# Patient Record
Sex: FEMALE | Race: BLACK OR AFRICAN AMERICAN | Employment: FULL TIME | URBAN - METROPOLITAN AREA
[De-identification: names, ages, dates, MRNs, and addresses within clinical notes are randomized per-mention and may not be internally consistent; named-entity substitution may affect disease eponyms.]

---

## 2019-02-13 NOTE — H&P
PATIENT HISTORY Patient with a long history uterine fibroids, prior removal of a single fibroid through open myomectomy in 2008. Later SBO. Has recurrent multiple large and small fibroids with associated menorrhagia. Uterus extends to 20wk gestational size Past Medical: Fibroids, GERD Past Surgical- Open myomectomy- 2008, open small bowel resection due to obstruction in 2012. Meds: Norethindrone 5mg/d, Zantac PRN Allergies: NKDA Soc: works as an army  Review of Systems:  
Constitutional: Meagan Guzmná denies fever, weight loss or other constitutional symptoms. Eye: Meagan Guzmán denies ocular pain or other visual symptoms. Cardiovascular: Meagan Guzmán denies palpitations, chest pain or other cardiovascular problem. Respiratory: Meagan Guzmán denies cough, breathing difficulty or other respiratory condition. Gastrointestinal: Tiarra denies nausea, vomiting, diarrhea, constipation or other gastrointestinal symptom. Genitourinary: Meagan Guzmán denies dysuria, incontinence, hematuria or other genitourinary symptom. Integumentary: Meagan Guzmán denies galactorrhea or other breast problem. Neurologic: Meagan Guzmán denies sensory defects, problems with coordination or concentration, or other neurologic problem. Psychiatric: Meagan Guzmán denies anxiey, depression or any psychiatric problems. VITAL SIGNS Height : 5 ft 7 in. Weight: 154 BMI: 24.158 BP: 113 / 78 Resp. : 16 . . PHYSICAL EXAM  
General Appearance: Well developed, well nourished patient in no acute distress. Skin: Normal. No rash, lesions or hirsutism. Head: Normocephalic / atraumatic. EENT: Normal eyes, ears, nose and throat. Neck: No masses, lymphadenopathy or thyromegaly. Chest: Normal breath sounds bilaterally with no rales, rhonchi or wheezes. Cardiac: Regular rate and rhythm without murmurs, rubs or gallops. Abdomen: Soft and non-tender to palpation. No organomegaly or massess. Extremities: No cyanosis, clubbing or edema. Neurologic: Grossly intact. Female Pelvic: Normal.  
 
 
DISCUSSION / INFORMED CONSENT The patient and mother was present for this discussion. We discussed the details of the planned surgery including the risks, benefits and treatment alternatives. We discussed the incision sites and the anticipated recovery period as well as the risks, benefits, and alternatives to the procedure. The risks include damage to bowel, bladder, or other organs, risk of bleeding, infection, adhesion formation, risk of the procedure not being able to be fully completed and need for future surgery. There are also small risks of requiring a blood transfusion, risks associated with anesthesia, development of deep venous thrombosis, heart attack, and even death. We discussed the possibility that this surgery may not completely eliminate her problem and that future procedures may be needed. Specific to a laparoscopy, there are additional risks of damage to internal organs due to the placement of intrabdominal trocars and instruments. In addition, there may be a need to convert the laparoscopy to an open procedure using a larger abdominal incision due to complications or inability to complete the procedure laparoscopically. Specific to a hysteroscopy are risks of uterine perforation, uterine bleeding, infection, and the risks of fluid overload due to absorption of hysteroscopic fluids. In some cases laparoscopy will need to be performed, and in rare cases, mainly due bleeding from perforation, a laparotomy, or incision on the abdomen will be necessary. We discusssed the laparoscopic myomectomy procedure including the anticipated abdominal incisions and small chance of needing to convert to an open procedure. In most cases, the majority of fibroids can be removed, but there is always a chance that all fibroids cannot be located or some may not be able to be removed.  Discussed the possibility or need to open the uterine cavity and risk of intrauterine scar tissue formation with this. Discussed the recurrence rate of fibroids and that they may need to be addressed again in the futire. Discussed the risk of bleeding and the possible development of coagulation disorders and/or the need for a blood transfusion during or after the procedure. Also discussed the risk of intraabdominal adhesion formation which could affect the intestines, fallopian tubes and/or subsequent fertility. We discussed the anticipated hospital course and recovery period. I discussed specifically risks associated with fibroid morcellation. Discussed the concerns that a fibroid may harbour a malignancy, but this chance is very low, but does increase with age. In the low chance that a malignancy was found, this could cause it to be in a higher stage possibly affecting treatments and outcomes. An alternative to morcellation would be an open myomectomy or possible removal vaginally, but this is dependent on the clinical situation. However, there is no evidence that an open myomectomy or non-morcellation myomectomy would be associated with better outcomes in the low chance of a malignancy. There are clearly increased risks in morbidity with an open myomectomy vs a laparoscopic myomectomy. Discussed that with her multiple fibroids, there will likely be residual fibroids that I may not be able to find and remove. This could require future surgery to address. With her history, she will have recurrences requiring a hysterectomy in the future most likely. Discussed the variability of bleeding and risks associated with this, and possible need for aborting the procedure early, or very small risk of requiring hysterectomy. Discussed varibility in scar tissue inside given her prior procedures and risk of preexisting tubal damage requiring treatment, including tubal removal.. Discussed risks of needing an open surgery as well. The patient voiced understanding of all of the risks and wished to proceed with the procedure. Informed consents were signed.  
 
PLAN:  
Robotic LSC myomectomy, [address]

## 2019-02-18 ENCOUNTER — HOSPITAL ENCOUNTER (OUTPATIENT)
Dept: SURGERY | Age: 34
Discharge: HOME | End: 2019-02-18

## 2019-02-19 VITALS — HEIGHT: 67 IN | BODY MASS INDEX: 24.96 KG/M2 | WEIGHT: 159 LBS

## 2019-02-19 RX ORDER — NORETHINDRONE 5 MG/1
5 TABLET ORAL
COMMUNITY
End: 2019-05-20

## 2019-02-19 RX ORDER — RANITIDINE 150 MG/1
150 CAPSULE ORAL 2 TIMES DAILY
COMMUNITY

## 2019-02-19 NOTE — PERIOP NOTES
Patient verified name and . Order for consent found in EHR and matches case posting; patient verifies procedure. Type 2 surgery, phone assessment complete. Orders received. Labs per surgeon: CBC DOS- order in EMR on hold for arrival  Labs per anesthesia protocol: no additional labs needed    Patient answered medical/surgical history questions at their best of ability. All prior to admission medications documented in [facility]. Patient instructed to take the following medications the day of surgery according to anesthesia guidelines with a small sip of water: ranitidine. Hold all vitamins 7 days prior to surgery and NSAIDS 5 days prior to surgery. Prescription meds to hold: none. Patient instructed on the following- given verbal and emailed written instructions per pt request:  Arrive at A Entrance, time of arrival to be called the day before by 1700  NPO after midnight including gum, mints, and ice chips  Responsible adult must drive patient to the hospital, stay during surgery, and patient will need supervision 24 hours after anesthesia  Use antibacterial soap in shower the night before surgery and on the morning of surgery  All piercings must be removed prior to arrival.    Leave all valuables (money and jewelry) at home but bring insurance card and ID on       DOS. Do not wear make-up, nail polish, lotions, cologne, perfumes, powders, or oil on skin. Patient teach back successful and patient demonstrates knowledge of instruction.

## 2019-02-23 ENCOUNTER — ANESTHESIA EVENT (OUTPATIENT)
Dept: SURGERY | Age: 34
End: 2019-02-23
Payer: OTHER GOVERNMENT

## 2019-02-25 ENCOUNTER — ANESTHESIA (OUTPATIENT)
Dept: SURGERY | Age: 34
End: 2019-02-25
Payer: OTHER GOVERNMENT

## 2019-02-25 ENCOUNTER — HOSPITAL ENCOUNTER (OUTPATIENT)
Age: 34
Discharge: HOME | End: 2019-02-25
Attending: OBSTETRICS & GYNECOLOGY | Admitting: OBSTETRICS & GYNECOLOGY
Payer: OTHER GOVERNMENT

## 2019-02-25 VITALS
OXYGEN SATURATION: 98 % | TEMPERATURE: 98 F | BODY MASS INDEX: 25 KG/M2 | RESPIRATION RATE: 16 BRPM | WEIGHT: 159.6 LBS | HEART RATE: 88 BPM | SYSTOLIC BLOOD PRESSURE: 131 MMHG | DIASTOLIC BLOOD PRESSURE: 83 MMHG

## 2019-02-25 DIAGNOSIS — R52 PAIN: Primary | ICD-10-CM

## 2019-02-25 PROBLEM — D21.9 FIBROIDS: Status: ACTIVE | Noted: 2019-02-25

## 2019-02-25 LAB
ABO + RH BLD: NORMAL
BLOOD GROUP ANTIBODIES SERPL: NORMAL
ERYTHROCYTE [DISTWIDTH] IN BLOOD BY AUTOMATED COUNT: 15.6 % (ref 11.9–14.6)
HCG UR QL: NEGATIVE
HCT VFR BLD AUTO: 42.1 % (ref 35.8–46.3)
HGB BLD-MCNC: 13.5 G/DL (ref 11.7–15.4)
MCH RBC QN AUTO: 28.2 PG (ref 26.1–32.9)
MCHC RBC AUTO-ENTMCNC: 32.1 G/DL (ref 31.4–35)
MCV RBC AUTO: 88.1 FL (ref 79.6–97.8)
NRBC # BLD: 0 K/UL (ref 0–0.2)
PLATELET # BLD AUTO: 215 K/UL (ref 150–450)
PMV BLD AUTO: 10.9 FL (ref 9.4–12.3)
RBC # BLD AUTO: 4.78 M/UL (ref 4.05–5.2)
SPECIMEN EXP DATE BLD: NORMAL
WBC # BLD AUTO: 5.3 K/UL (ref 4.3–11.1)

## 2019-02-25 PROCEDURE — C1765 ADHESION BARRIER: HCPCS | Performed by: OBSTETRICS & GYNECOLOGY

## 2019-02-25 PROCEDURE — 77030008771 HC TU NG SALEM SUMP -A: Performed by: ANESTHESIOLOGY

## 2019-02-25 PROCEDURE — 77030032490 HC SLV COMPR SCD KNE COVD -B: Performed by: OBSTETRICS & GYNECOLOGY

## 2019-02-25 PROCEDURE — 74011000258 HC RX REV CODE- 258: Performed by: OBSTETRICS & GYNECOLOGY

## 2019-02-25 PROCEDURE — 77030008703 HC TU ET UNCUF COVD -A: Performed by: ANESTHESIOLOGY

## 2019-02-25 PROCEDURE — 77030011502 HC MANIP UTER ZUM ZINN -B: Performed by: OBSTETRICS & GYNECOLOGY

## 2019-02-25 PROCEDURE — C1782 MORCELLATOR: HCPCS | Performed by: OBSTETRICS & GYNECOLOGY

## 2019-02-25 PROCEDURE — 77030031492 HC PRT ACC BLNT AIRSEAL CNMD -B: Performed by: OBSTETRICS & GYNECOLOGY

## 2019-02-25 PROCEDURE — 77030035277 HC OBTRTR BLDELSS DISP INTU -B: Performed by: OBSTETRICS & GYNECOLOGY

## 2019-02-25 PROCEDURE — 77030020782 HC GWN BAIR PAWS FLX 3M -B: Performed by: ANESTHESIOLOGY

## 2019-02-25 PROCEDURE — 77030018836 HC SOL IRR NACL ICUM -A: Performed by: OBSTETRICS & GYNECOLOGY

## 2019-02-25 PROCEDURE — 81025 URINE PREGNANCY TEST: CPT

## 2019-02-25 PROCEDURE — 77030022704 HC SUT VLOC COVD -B: Performed by: OBSTETRICS & GYNECOLOGY

## 2019-02-25 PROCEDURE — 77030008477 HC STYL SATN SLP COVD -A: Performed by: ANESTHESIOLOGY

## 2019-02-25 PROCEDURE — 77030002933 HC SUT MCRYL J&J -A: Performed by: OBSTETRICS & GYNECOLOGY

## 2019-02-25 PROCEDURE — 88307 TISSUE EXAM BY PATHOLOGIST: CPT

## 2019-02-25 PROCEDURE — 74011250636 HC RX REV CODE- 250/636: Performed by: OBSTETRICS & GYNECOLOGY

## 2019-02-25 PROCEDURE — 74011250636 HC RX REV CODE- 250/636

## 2019-02-25 PROCEDURE — 74011250636 HC RX REV CODE- 250/636: Performed by: ANESTHESIOLOGY

## 2019-02-25 PROCEDURE — 76010000882 HC OR TIME 5 TO 5.5HR INTENSV - TIER 2: Performed by: OBSTETRICS & GYNECOLOGY

## 2019-02-25 PROCEDURE — 77030035029 HC NDL INSUF VERES DISP COVD -B: Performed by: OBSTETRICS & GYNECOLOGY

## 2019-02-25 PROCEDURE — 74011000250 HC RX REV CODE- 250

## 2019-02-25 PROCEDURE — 77030031139 HC SUT VCRL2 J&J -A: Performed by: OBSTETRICS & GYNECOLOGY

## 2019-02-25 PROCEDURE — 85027 COMPLETE CBC AUTOMATED: CPT

## 2019-02-25 PROCEDURE — 77030034696 HC CATH URETH FOL 2W BARD -A: Performed by: OBSTETRICS & GYNECOLOGY

## 2019-02-25 PROCEDURE — 74011250637 HC RX REV CODE- 250/637: Performed by: ANESTHESIOLOGY

## 2019-02-25 PROCEDURE — 77030039266 HC ADH SKN EXOFIN S2SG -A: Performed by: OBSTETRICS & GYNECOLOGY

## 2019-02-25 PROCEDURE — 77030035048 HC TRCR ENDOSC OPTCL COVD -B: Performed by: OBSTETRICS & GYNECOLOGY

## 2019-02-25 PROCEDURE — 77030035044 HC TRCR ENDOSC VRSPRT BLDLSS COVD -C: Performed by: OBSTETRICS & GYNECOLOGY

## 2019-02-25 PROCEDURE — 77030020263 HC SOL INJ SOD CL0.9% LFCR 1000ML: Performed by: OBSTETRICS & GYNECOLOGY

## 2019-02-25 PROCEDURE — 77030018846 HC SOL IRR STRL H20 ICUM -A: Performed by: OBSTETRICS & GYNECOLOGY

## 2019-02-25 PROCEDURE — 86900 BLOOD TYPING SEROLOGIC ABO: CPT

## 2019-02-25 PROCEDURE — 74011000250 HC RX REV CODE- 250: Performed by: OBSTETRICS & GYNECOLOGY

## 2019-02-25 PROCEDURE — 76060000042 HC ANESTHESIA 5.5 TO 6 HR: Performed by: OBSTETRICS & GYNECOLOGY

## 2019-02-25 PROCEDURE — 74011250637 HC RX REV CODE- 250/637: Performed by: OBSTETRICS & GYNECOLOGY

## 2019-02-25 PROCEDURE — 77030039425 HC BLD LARYNG TRULITE DISP TELE -A: Performed by: ANESTHESIOLOGY

## 2019-02-25 PROCEDURE — 77030008756 HC TU IRR SUC STRY -B: Performed by: OBSTETRICS & GYNECOLOGY

## 2019-02-25 PROCEDURE — 77030000038 HC TIP SCIS LAPSCP SURI -B: Performed by: OBSTETRICS & GYNECOLOGY

## 2019-02-25 PROCEDURE — 77030010545: Performed by: OBSTETRICS & GYNECOLOGY

## 2019-02-25 PROCEDURE — 77030016151 HC PROTCTR LNS DFOG COVD -B: Performed by: OBSTETRICS & GYNECOLOGY

## 2019-02-25 PROCEDURE — 76210000020 HC REC RM PH II FIRST 0.5 HR: Performed by: OBSTETRICS & GYNECOLOGY

## 2019-02-25 PROCEDURE — 77030019927 HC TBNG IRR CYSTO BAXT -A: Performed by: OBSTETRICS & GYNECOLOGY

## 2019-02-25 PROCEDURE — 76210000017 HC OR PH I REC 1.5 TO 2 HR: Performed by: OBSTETRICS & GYNECOLOGY

## 2019-02-25 RX ORDER — GLYCOPYRROLATE 0.2 MG/ML
INJECTION INTRAMUSCULAR; INTRAVENOUS AS NEEDED
Status: DISCONTINUED | OUTPATIENT
Start: 2019-02-25 | End: 2019-02-25 | Stop reason: HOSPADM

## 2019-02-25 RX ORDER — FENTANYL CITRATE 50 UG/ML
INJECTION, SOLUTION INTRAMUSCULAR; INTRAVENOUS AS NEEDED
Status: DISCONTINUED | OUTPATIENT
Start: 2019-02-25 | End: 2019-02-25 | Stop reason: HOSPADM

## 2019-02-25 RX ORDER — SODIUM CHLORIDE 0.9 % (FLUSH) 0.9 %
5-40 SYRINGE (ML) INJECTION AS NEEDED
Status: DISCONTINUED | OUTPATIENT
Start: 2019-02-25 | End: 2019-02-25 | Stop reason: HOSPADM

## 2019-02-25 RX ORDER — SODIUM CHLORIDE 0.9 % (FLUSH) 0.9 %
5-40 SYRINGE (ML) INJECTION AS NEEDED
Status: CANCELLED | OUTPATIENT
Start: 2019-02-25

## 2019-02-25 RX ORDER — BUPIVACAINE HYDROCHLORIDE AND EPINEPHRINE 2.5; 5 MG/ML; UG/ML
INJECTION, SOLUTION EPIDURAL; INFILTRATION; INTRACAUDAL; PERINEURAL AS NEEDED
Status: DISCONTINUED | OUTPATIENT
Start: 2019-02-25 | End: 2019-02-25 | Stop reason: HOSPADM

## 2019-02-25 RX ORDER — ROCURONIUM BROMIDE 10 MG/ML
INJECTION, SOLUTION INTRAVENOUS AS NEEDED
Status: DISCONTINUED | OUTPATIENT
Start: 2019-02-25 | End: 2019-02-25 | Stop reason: HOSPADM

## 2019-02-25 RX ORDER — OXYCODONE HYDROCHLORIDE 5 MG/1
5 TABLET ORAL
Status: DISCONTINUED | OUTPATIENT
Start: 2019-02-25 | End: 2019-02-25 | Stop reason: HOSPADM

## 2019-02-25 RX ORDER — ONDANSETRON 2 MG/ML
INJECTION INTRAMUSCULAR; INTRAVENOUS AS NEEDED
Status: DISCONTINUED | OUTPATIENT
Start: 2019-02-25 | End: 2019-02-25 | Stop reason: HOSPADM

## 2019-02-25 RX ORDER — LIDOCAINE HYDROCHLORIDE 20 MG/ML
INJECTION, SOLUTION EPIDURAL; INFILTRATION; INTRACAUDAL; PERINEURAL AS NEEDED
Status: DISCONTINUED | OUTPATIENT
Start: 2019-02-25 | End: 2019-02-25 | Stop reason: HOSPADM

## 2019-02-25 RX ORDER — SODIUM CHLORIDE 0.9 % (FLUSH) 0.9 %
5-40 SYRINGE (ML) INJECTION EVERY 8 HOURS
Status: DISCONTINUED | OUTPATIENT
Start: 2019-02-25 | End: 2019-02-25 | Stop reason: HOSPADM

## 2019-02-25 RX ORDER — DEXAMETHASONE SODIUM PHOSPHATE 4 MG/ML
INJECTION, SOLUTION INTRA-ARTICULAR; INTRALESIONAL; INTRAMUSCULAR; INTRAVENOUS; SOFT TISSUE AS NEEDED
Status: DISCONTINUED | OUTPATIENT
Start: 2019-02-25 | End: 2019-02-25 | Stop reason: HOSPADM

## 2019-02-25 RX ORDER — SODIUM CHLORIDE 0.9 % (FLUSH) 0.9 %
5-40 SYRINGE (ML) INJECTION EVERY 8 HOURS
Status: CANCELLED | OUTPATIENT
Start: 2019-02-25

## 2019-02-25 RX ORDER — PROPOFOL 10 MG/ML
INJECTION, EMULSION INTRAVENOUS AS NEEDED
Status: DISCONTINUED | OUTPATIENT
Start: 2019-02-25 | End: 2019-02-25 | Stop reason: HOSPADM

## 2019-02-25 RX ORDER — OXYCODONE AND ACETAMINOPHEN 10; 325 MG/1; MG/1
1 TABLET ORAL AS NEEDED
Status: DISCONTINUED | OUTPATIENT
Start: 2019-02-25 | End: 2019-02-25 | Stop reason: HOSPADM

## 2019-02-25 RX ORDER — VASOPRESSIN 20 U/ML
INJECTION PARENTERAL AS NEEDED
Status: DISCONTINUED | OUTPATIENT
Start: 2019-02-25 | End: 2019-02-25 | Stop reason: HOSPADM

## 2019-02-25 RX ORDER — MISOPROSTOL 200 UG/1
400 TABLET ORAL ONCE
Status: COMPLETED | OUTPATIENT
Start: 2019-02-25 | End: 2019-02-25

## 2019-02-25 RX ORDER — CEFAZOLIN SODIUM/WATER 2 G/20 ML
2 SYRINGE (ML) INTRAVENOUS ONCE
Status: COMPLETED | OUTPATIENT
Start: 2019-02-25 | End: 2019-02-25

## 2019-02-25 RX ORDER — METHYLENE BLUE 10 MG/ML
INJECTION INTRAVENOUS AS NEEDED
Status: DISCONTINUED | OUTPATIENT
Start: 2019-02-25 | End: 2019-02-25 | Stop reason: HOSPADM

## 2019-02-25 RX ORDER — ACETAMINOPHEN 10 MG/ML
INJECTION, SOLUTION INTRAVENOUS AS NEEDED
Status: DISCONTINUED | OUTPATIENT
Start: 2019-02-25 | End: 2019-02-25 | Stop reason: HOSPADM

## 2019-02-25 RX ORDER — SODIUM CHLORIDE 900 MG/100ML
INJECTION INTRAVENOUS AS NEEDED
Status: DISCONTINUED | OUTPATIENT
Start: 2019-02-25 | End: 2019-02-25 | Stop reason: HOSPADM

## 2019-02-25 RX ORDER — MIDAZOLAM HYDROCHLORIDE 1 MG/ML
2 INJECTION, SOLUTION INTRAMUSCULAR; INTRAVENOUS
Status: COMPLETED | OUTPATIENT
Start: 2019-02-25 | End: 2019-02-25

## 2019-02-25 RX ORDER — IBUPROFEN 800 MG/1
800 TABLET ORAL
Qty: 60 TAB | Refills: 0 | Status: SHIPPED | OUTPATIENT
Start: 2019-02-25 | End: 2019-03-07

## 2019-02-25 RX ORDER — NEOSTIGMINE METHYLSULFATE 1 MG/ML
INJECTION INTRAVENOUS AS NEEDED
Status: DISCONTINUED | OUTPATIENT
Start: 2019-02-25 | End: 2019-02-25 | Stop reason: HOSPADM

## 2019-02-25 RX ORDER — SODIUM CHLORIDE, SODIUM LACTATE, POTASSIUM CHLORIDE, CALCIUM CHLORIDE 600; 310; 30; 20 MG/100ML; MG/100ML; MG/100ML; MG/100ML
75 INJECTION, SOLUTION INTRAVENOUS CONTINUOUS
Status: DISCONTINUED | OUTPATIENT
Start: 2019-02-25 | End: 2019-02-25 | Stop reason: HOSPADM

## 2019-02-25 RX ORDER — OXYCODONE AND ACETAMINOPHEN 5; 325 MG/1; MG/1
2 TABLET ORAL
Qty: 30 TAB | Refills: 0 | Status: SHIPPED | OUTPATIENT
Start: 2019-02-25 | End: 2019-05-20

## 2019-02-25 RX ORDER — HYDROMORPHONE HYDROCHLORIDE 2 MG/ML
0.5 INJECTION, SOLUTION INTRAMUSCULAR; INTRAVENOUS; SUBCUTANEOUS
Status: COMPLETED | OUTPATIENT
Start: 2019-02-25 | End: 2019-02-25

## 2019-02-25 RX ORDER — KETAMINE HYDROCHLORIDE 100 MG/ML
INJECTION, SOLUTION INTRAMUSCULAR; INTRAVENOUS AS NEEDED
Status: DISCONTINUED | OUTPATIENT
Start: 2019-02-25 | End: 2019-02-25 | Stop reason: HOSPADM

## 2019-02-25 RX ADMIN — LIDOCAINE HYDROCHLORIDE 100 MG: 20 INJECTION, SOLUTION EPIDURAL; INFILTRATION; INTRACAUDAL; PERINEURAL at 07:51

## 2019-02-25 RX ADMIN — MIDAZOLAM 2 MG: 1 INJECTION INTRAMUSCULAR; INTRAVENOUS at 07:17

## 2019-02-25 RX ADMIN — ROCURONIUM BROMIDE 40 MG: 10 INJECTION, SOLUTION INTRAVENOUS at 07:51

## 2019-02-25 RX ADMIN — ROCURONIUM BROMIDE 10 MG: 10 INJECTION, SOLUTION INTRAVENOUS at 08:22

## 2019-02-25 RX ADMIN — FENTANYL CITRATE 50 MCG: 50 INJECTION, SOLUTION INTRAMUSCULAR; INTRAVENOUS at 12:50

## 2019-02-25 RX ADMIN — FENTANYL CITRATE 50 MCG: 50 INJECTION, SOLUTION INTRAMUSCULAR; INTRAVENOUS at 08:49

## 2019-02-25 RX ADMIN — MISOPROSTOL 400 MCG: 200 TABLET ORAL at 06:16

## 2019-02-25 RX ADMIN — SODIUM CHLORIDE, SODIUM LACTATE, POTASSIUM CHLORIDE, AND CALCIUM CHLORIDE: 600; 310; 30; 20 INJECTION, SOLUTION INTRAVENOUS at 11:03

## 2019-02-25 RX ADMIN — HYDROMORPHONE HYDROCHLORIDE 0.5 MG: 2 INJECTION, SOLUTION INTRAMUSCULAR; INTRAVENOUS; SUBCUTANEOUS at 13:28

## 2019-02-25 RX ADMIN — HYDROMORPHONE HYDROCHLORIDE 0.5 MG: 2 INJECTION, SOLUTION INTRAMUSCULAR; INTRAVENOUS; SUBCUTANEOUS at 13:44

## 2019-02-25 RX ADMIN — HYDROMORPHONE HYDROCHLORIDE 0.5 MG: 2 INJECTION, SOLUTION INTRAMUSCULAR; INTRAVENOUS; SUBCUTANEOUS at 13:20

## 2019-02-25 RX ADMIN — DEXAMETHASONE SODIUM PHOSPHATE 10 MG: 4 INJECTION, SOLUTION INTRA-ARTICULAR; INTRALESIONAL; INTRAMUSCULAR; INTRAVENOUS; SOFT TISSUE at 11:11

## 2019-02-25 RX ADMIN — SODIUM CHLORIDE, SODIUM LACTATE, POTASSIUM CHLORIDE, AND CALCIUM CHLORIDE 75 ML/HR: 600; 310; 30; 20 INJECTION, SOLUTION INTRAVENOUS at 06:16

## 2019-02-25 RX ADMIN — NEOSTIGMINE METHYLSULFATE 4 MG: 1 INJECTION INTRAVENOUS at 12:52

## 2019-02-25 RX ADMIN — FENTANYL CITRATE 50 MCG: 50 INJECTION, SOLUTION INTRAMUSCULAR; INTRAVENOUS at 08:15

## 2019-02-25 RX ADMIN — ROCURONIUM BROMIDE 10 MG: 10 INJECTION, SOLUTION INTRAVENOUS at 10:59

## 2019-02-25 RX ADMIN — HYDROMORPHONE HYDROCHLORIDE 0.5 MG: 2 INJECTION, SOLUTION INTRAMUSCULAR; INTRAVENOUS; SUBCUTANEOUS at 13:36

## 2019-02-25 RX ADMIN — KETAMINE HYDROCHLORIDE 15 MG: 100 INJECTION, SOLUTION INTRAMUSCULAR; INTRAVENOUS at 09:00

## 2019-02-25 RX ADMIN — KETAMINE HYDROCHLORIDE 15 MG: 100 INJECTION, SOLUTION INTRAMUSCULAR; INTRAVENOUS at 10:55

## 2019-02-25 RX ADMIN — FENTANYL CITRATE 50 MCG: 50 INJECTION, SOLUTION INTRAMUSCULAR; INTRAVENOUS at 07:51

## 2019-02-25 RX ADMIN — ROCURONIUM BROMIDE 10 MG: 10 INJECTION, SOLUTION INTRAVENOUS at 11:43

## 2019-02-25 RX ADMIN — PROPOFOL 200 MG: 10 INJECTION, EMULSION INTRAVENOUS at 07:51

## 2019-02-25 RX ADMIN — ACETAMINOPHEN 1000 MG: 10 INJECTION, SOLUTION INTRAVENOUS at 12:52

## 2019-02-25 RX ADMIN — ROCURONIUM BROMIDE 10 MG: 10 INJECTION, SOLUTION INTRAVENOUS at 09:04

## 2019-02-25 RX ADMIN — ONDANSETRON 4 MG: 2 INJECTION INTRAMUSCULAR; INTRAVENOUS at 11:11

## 2019-02-25 RX ADMIN — Medication 2 G: at 07:49

## 2019-02-25 RX ADMIN — ROCURONIUM BROMIDE 20 MG: 10 INJECTION, SOLUTION INTRAVENOUS at 08:30

## 2019-02-25 RX ADMIN — ROCURONIUM BROMIDE 10 MG: 10 INJECTION, SOLUTION INTRAVENOUS at 09:48

## 2019-02-25 RX ADMIN — ROCURONIUM BROMIDE 10 MG: 10 INJECTION, SOLUTION INTRAVENOUS at 10:28

## 2019-02-25 RX ADMIN — SODIUM CHLORIDE, SODIUM LACTATE, POTASSIUM CHLORIDE, AND CALCIUM CHLORIDE: 600; 310; 30; 20 INJECTION, SOLUTION INTRAVENOUS at 08:30

## 2019-02-25 RX ADMIN — KETAMINE HYDROCHLORIDE 35 MG: 100 INJECTION, SOLUTION INTRAMUSCULAR; INTRAVENOUS at 07:51

## 2019-02-25 RX ADMIN — KETAMINE HYDROCHLORIDE 15 MG: 100 INJECTION, SOLUTION INTRAMUSCULAR; INTRAVENOUS at 09:55

## 2019-02-25 RX ADMIN — OXYCODONE HYDROCHLORIDE AND ACETAMINOPHEN 1 TABLET: 10; 325 TABLET ORAL at 14:43

## 2019-02-25 RX ADMIN — GLYCOPYRROLATE 0.6 MG: 0.2 INJECTION INTRAMUSCULAR; INTRAVENOUS at 12:52

## 2019-02-25 NOTE — BRIEF OP NOTE
BRIEF OPERATIVE NOTE Date of Procedure: 2/25/2019 Preoperative Diagnosis: Submucous leiomyoma of uterus [D25.0] Postoperative Diagnosis: Submucous leiomyoma of uterus [D25.0] Procedure(s): 
ROBOTIC ASSISTED MYOMECTOMY, requiring 4 hours total time HYSTEROSCOPY Surgeon(s) and Role: Jolie Cheadle, Angie Whitaker MD - Primary Surgical Assistant: none Surgical Staff: 
Circ-1: Cristofer Banda RN 
Circ-2: Varinder Patel RN 
Circ-Relief: Nick Oliva RN Scrub Tech-1: Lewis Monsivais Scrub Tech-2: Trinidad Cosby Event Time In Time Out Incision Start 1808 Incision Close 1258 Anesthesia: General  
Estimated Blood Loss: 500cc Specimens:  
ID Type Source Tests Collected by Time Destination 1 : UTERINE [id number] Fresh Uterine  Christal Bunch MD 2/25/2019 1240 Pathology Findings: uterus enlarged to 21wks size. Intraabdominal adhesions with omentum adhered densely to anterior abdominal wall, full uterus and anterior pelvis. Adhesions of left tube to anterior uterus as well. Uterus enlarged with multiple fibroids. Total of 26 fibroids removed with the largest being 8cm, and at least 15fibroids >3cm. Multiple small fibroids still remain. Submucosal fibroids intended to be removed by [facility] could not be done due to lack of proper working  surgical instruments Complications: none Implants: * No implants in log *

## 2019-02-25 NOTE — PERIOP NOTES
Pt's family in waiting room updated that surgery is going well and will probably take about 30-45 minutes more.

## 2019-02-25 NOTE — DISCHARGE INSTRUCTIONS
Patient Education        Abdominal Myomectomy: What to Expect at [address] can expect to feel better and stronger each day, although you may tire quickly and need pain medicine for a week or two. You may need about 4 to 6 weeks to fully recover. Do not lift anything heavy while you are recovering so that your incision and your belly muscles can heal.  This care sheet gives you a general idea about how long it will take for you to recover. But each person recovers at a different pace. Follow the steps below to get better as quickly as possible. How can you care for yourself at home? Activity    · Rest when you feel tired. Getting enough sleep will help you recover.     · Try to walk each day. Start out by walking a little more than you did the day before. Bit by bit, increase the amount you walk. Walking boosts blood flow and helps prevent pneumonia and constipation.     · For 4 to 6 weeks, avoid lifting anything that would make you strain. This may include a child, heavy grocery bags and milk containers, a heavy briefcase or backpack, cat litter or dog food bags, or a vacuum .     · Avoid strenuous activities, such as biking, jogging, weightlifting, and aerobic exercise, for 4 to 6 weeks.     · You may shower. Pat the incision dry when you are done. Do not take a bath for the first week after surgery or until your doctor tells you it is okay.     · You may have some light vaginal bleeding. Wear sanitary pads if needed. Do not douche or use tampons.     · Ask your doctor when you can drive again.     · You will probably need to take 2 to 4 weeks off work. It depends on the type of work you do and how you feel.     · Do not have sex until your doctor tells you it is okay.     · Talk about birth control with your doctor. Do not try to become pregnant until your doctor says it is okay. Diet    · You can eat your normal diet.  If your stomach is upset, try bland, low-fat foods like plain rice, broiled chicken, toast, and yogurt.     · Drink plenty of fluids (unless your doctor tells you not to).     · You may notice that your bowel movements are not regular right after your surgery. This is common. Try to avoid constipation and straining with bowel movements. You may want to take a fiber supplement every day. If you have not had a bowel movement after a couple of days, ask your doctor about taking a mild laxative. Medicines    · Your doctor will tell you if and when you can restart your medicines. He or she will also give you instructions about taking any new medicines.     · If you take blood thinners, such as warfarin (Coumadin), clopidogrel (Plavix), or aspirin, be sure to talk to your doctor. He or she will tell you if and when to start taking those medicines again. Make sure that you understand exactly what your doctor wants you to do.     · Take pain medicines exactly as directed. ? If the doctor gave you a prescription medicine for pain, take it as prescribed. ? If you are not taking a prescription pain medicine, take an over-the-counter medicine such as acetaminophen (Tylenol), ibuprofen (Advil, Motrin), or naproxen (Aleve). Read and follow all instructions on the label. Do not take two or more pain medicines at the same time unless the doctor told you to. Many pain medicines contain acetaminophen, which is Tylenol. Too much acetaminophen (Tylenol) can be harmful.     · If you think your pain medicine is making you sick to your stomach:  ? Take your medicine after meals (unless your doctor tells you not to). ? Ask your doctor for a different pain medicine.     · If your doctor prescribed antibiotics, take them as directed. Do not stop taking them just because you feel better. You need to take the full course of antibiotics.    Incision care    · If you have strips of tape on the cut (incision) the doctor made, leave the tape on for a week or until it falls off.     · Wash the area daily with warm, soapy water and pat it dry.     · Keep the area clean and dry. You may cover it with a gauze bandage if it weeps or rubs against clothing. Change the bandage every day. Other instructions    · Wear loose, comfortable clothing and avoid anything that puts pressure on your belly for a few weeks. Follow-up care is a key part of your treatment and safety. Be sure to make and go to all appointments, and call your doctor if you are having problems. It's also a good idea to know your test results and keep a list of the medicines you take. When should you call for help? Call 911 anytime you think you may need emergency care. For example, call if:    · You passed out (lost consciousness).     · You have chest pain, are short of breath, or cough up blood.    Call your doctor now or seek immediate medical care if:    · You have pain that does not get better after you take pain medicine.     · You cannot pass stools or gas.     · You have vaginal discharge that has increased in amount or smells bad.     · You are sick to your stomach or cannot drink fluids.     · You have loose stitches, or your incision comes open.     · Bright red blood has soaked through the bandage over your incision.     · You have signs of infection, such as:  ? Increased pain, swelling, warmth, or redness. ? Red streaks leading from the incision. ? Pus draining from the incision. ? A fever.     · You have bright red vaginal bleeding that soaks one or more pads in an hour, or you have large clots.     · You have signs of a blood clot in your leg (called a deep vein thrombosis), such as:  ? Pain in your calf, back of the knee, thigh, or groin. ? Redness and swelling in your leg or groin.    Watch closely for any changes in your health, and be sure to contact your doctor if you have any problems. Where can you learn more? Go to http://cris-trevor.info/.   Enter L616 in the search box to learn more about \"Abdominal Myomectomy: What to Expect at Home. \"  Current as of: May 14, 2018  Content Version: 11.9  © 3192-7118 [employer]. Care instructions adapted under license by [employer] (which disclaims liability or warranty for this information). If you have questions about a medical condition or this instruction, always ask your healthcare professional. [address] any warranty or liability for your use of this information.

## 2019-02-25 NOTE — ANESTHESIA POSTPROCEDURE EVALUATION
Procedure(s): 
ROBOTIC ASSISTED MYOMECTOMY HYSTEROSCOPY. Anesthesia Post Evaluation Multimodal analgesia: multimodal analgesia used between 6 hours prior to anesthesia start to PACU discharge Patient location during evaluation: bedside Patient participation: complete - patient participated Level of consciousness: awake and alert Pain score: 1 Pain management: adequate Airway patency: patent Anesthetic complications: no 
Cardiovascular status: acceptable Respiratory status: acceptable Hydration status: acceptable Comments: Patient doing well. Continue care on floor. Post anesthesia nausea and vomiting:  none Visit Vitals /71 (BP 1 Location: Left arm, BP Patient Position: At rest) Pulse 84 Temp 36.7 °C (98 °F) Resp 14 Wt 72.4 kg (159 lb 9.6 oz) SpO2 100% BMI 25.00 kg/m²

## 2019-02-25 NOTE — ANESTHESIA PREPROCEDURE EVALUATION
Anesthetic History No history of anesthetic complications Review of Systems / Medical History Patient summary reviewed and pertinent labs reviewed Pulmonary Within defined limits Neuro/Psych Within defined limits Cardiovascular Exercise tolerance: >4 METS 
  
GI/Hepatic/Renal 
  
GERD: well controlled Endo/Other Within defined limits Other Findings Physical Exam 
 
Airway Mallampati: II 
TM Distance: > 6 cm Neck ROM: normal range of motion Mouth opening: Normal 
 
 Cardiovascular Rhythm: regular Dental 
 
 
  
Pulmonary Abdominal 
GI exam deferred Other Findings Anesthetic Plan ASA: 2 Anesthesia type: general 
 
 
 
 
Induction: Intravenous Anesthetic plan and risks discussed with: Patient Ketamine and Ofirmev analgesic supplement

## 2019-02-26 NOTE — OP NOTES
[place]  OPERATIVE REPORT    Name:  Ravin Connelly  MR#:  [id number]  :  1985  ACCOUNT #:  [de-identified]  DATE OF SERVICE:  2019      POSTOPERATIVE DIAGNOSIS: POSTOPERATIVE DIAGNOSES:  1.  Multiple large uterine fibroids. 2.  Menorrhagia. 3.  Intraabdominal adhesions. PROCEDURE PERFORMED:  1.  Robotic-assisted laparoscopic myomectomy of 26 uterine fibroids requiring a total surgical time of 4 hours. 2. Lysis of intraabdominal adhesions requiring 30 minutes of additional time. 3.  Diagnostic hysteroscopy. SURGEON:  JAELYN Short:  None. ANESTHESIA:  General endotracheal.    COMPLICATIONS:  None. DRAINS:  None. SPECIMENS REMOVED:  Uterine fibroids x26 with an approximate weight of 650 g. IMPLANTS:  None. ESTIMATED BLOOD LOSS:  500 mL. FINDINGS:  Uterus was enlarged to 21 weeks gestational size, there were excessive intraabdominal adhesions from the patient's prior open laparotomy x2 via vertical midline incisions. The majority of these adhesions were the omentum, which was densely adhered to anterior abdominal wall, to the full uterus and the anterior pelvis. There were adhesions of the left tube to the anterior uterus as well. There were no direct bowel adhesions to the uterus or abdominal wall. The uterus was enlarged with multiple fibroids, the largest being approximately 8 cm and at least 15 fibroids greater than 3 cm, multiple small fibroids still remain. At hysteroscopy there were multiple submucosal fibroids and I attempted to remove these but because of lack of proper working surgical instruments in the operating room, this could not be done. The right tube and ovary appeared normal.  The left ovary was involved in adhesions as well, but the fimbriated ends of the tubes were preserved. PROCEDURE:  The patient was taken to the operating room, where general endotracheal anesthesia was administered.   She was prepped and draped in normal sterile fashion. Escamilla catheter was inserted. Diagnostic hysteroscopy was performed and there were several submucosal fibroids and initial attempts to remove them revealed that there were no functional hysteroscopic scissors at this facility thus precluding the ability to complete a planned critical portion of the procedure. A HUMI uterine manipulator was then placed. Attention was then turned to the abdomen. The uterus extended to approximately 21 weeks gestational size. There was a vertical midline incision extending from the pubis all the way to the upper umbilicus. A left upper quadrant entry was performed at [facility] insufflating with a Veress needle after deflating the stomach with an OG tube. Veress needle was placed into abdomen without difficulty and the abdomen was insufflated to 5 mmHg after a bladeless trocar was placed under direct visualization into the abdominal cavity, which revealed extensive adhesions of the omentum to the anterior abdominal wall extending from the falciform ligament all the way to the bladder. Laparoscopic scissors were used to take down the adhesions enough to allow placement of the remainder of the trocars. A 12 mm trocar was placed in the midline approximately midway between the umbilicus and the xiphoid. Three 8 mm robotic ports were then placed and a 12 mm assistant port. Patient was placed in Trendelenburg position and the robotic system was then undocked. The robot was used to perform further extensive lysis of adhesions. The lysis of adhesions required an additional 30 minutes of total time. Once the omentum was fully relieved of its adhesions to the anterior abdominal wall and uterus the myomectomy was then performed. A dilute vasopressin solution was injected into the upper posterior myometrium and transverse hysterotomy incisions were made across 3 fibroids at the upper fundus.   These fibroids were enucleated individually and then the uterine defect was closed in multiple layers using 2-0 V-Loc sutures. Further posterior incisions were created removing additional posterior fibroids and all of these were in the 5 cm to 6 cm range posteriorly. There was one additional one on the posterior aspect of the cervix that was approximately 3 cm that was removed in similar fashion. Attention was then turned anteriorly. The patient had had a prior myomectomy and it was believed that this had been removed from the upper anterior fundus based on MRI pictures and dissection in this area was more difficult supporting the fact. Multiple 4  to 5 cm fibroids were removed from the anterior fundus of uterus. Vertical midline incision was made through the uterus to address largest anterior fibroid, which was approximately 7 cm. This was removed and then a second fibroid under this was removed as well that had significant necrosis. Each of these were closed individually and due to the extensive number of incisions and the need for individual closure this added significantly to the surgical time. Several other right anterior 4 cm fibroids were removed as well. Due to the large size of the uterus and its limited mobility in the pelvis it was not possible to reach all the way down to the submucosal fibroids that had previously been seen. Uterus was very bulky and enlarged and did not contract down very well likely due to the prior open myomectomy. At the end of the procedure a total of 26 fibroids, which were removed with an estimated 15 of them being greater than 3 cm. Blood loss was moderate at 500 mL and the patient had been administered misoprostol and tranexamic acid prior to the start of the case. It was felt at this point that as many fibroids as could feasibly be obtained had been removed. the robotic system was undocked.   Careful count of the intraabdominal fibroids had been made and a Susan morcellator was used to fully morcellate all of the uterine fibroids taking great care to ensure that all portions of the fibroids were removed from the abdomen and pelvis. The abdomen and pelvis were copiously irrigated again. The uterus was fully hemostatic at this point. Three sheets of Interceed were placed covering the fundus and the uterus in an effort to reduce adhesion formation. The umbilical fascial incision was then closed with a 0 Vicryl suture. The abdomen was deflated. All skin incisions were closed with 4-0 Monocryl and with surgical glue and Steri-Strips. All sponge, lap, and needle counts were correct. There were no other complications to the procedure.         Risa Askew MD      [id number]  D:  02/25/2019 13:53  T:  02/25/2019 23:41  JOB #:  [id number]  CC:

## 2019-05-14 ENCOUNTER — HOSPITAL ENCOUNTER (OUTPATIENT)
Dept: SURGERY | Age: 34
Discharge: HOME | End: 2019-05-14

## 2019-05-14 VITALS — BODY MASS INDEX: 24.48 KG/M2 | WEIGHT: 156 LBS | HEIGHT: 67 IN

## 2019-05-14 NOTE — PERIOP NOTES
Patient verified name and . Order for consent not found in EHR. Type 1B surgery, PAT phone assessment complete. Orders not received. Labs per surgeon: no orders  Labs per anesthesia protocol: Hgb DOS. Patient answered medical/surgical history questions at their best of ability. All prior to admission medications documented in [facility]. Patient instructed to take the following medications the day of surgery according to anesthesia guidelines with a small sip of water: zantac. Hold all vitamins 7 days prior to surgery and NSAIDS 5 days prior to surgery. Prescription meds to hold:none. Patient instructed on the following:  Arrive at A Entrance, time of arrival to be called the day before by 1700  NPO after midnight including gum, mints, and ice chips  Responsible adult must drive patient to the hospital, stay during surgery, and patient will need supervision 24 hours after anesthesia  Use antibacterial soap in shower the night before surgery and on the morning of surgery  All piercings must be removed prior to arrival.    Leave all valuables (money and jewelry) at home but bring insurance card and ID on   DOS. Do not wear make-up, nail polish, lotions, cologne, perfumes, powders, or oil on skin. Patient teach back successful and patient demonstrates knowledge of instruction.

## 2019-05-19 ENCOUNTER — ANESTHESIA EVENT (OUTPATIENT)
Dept: SURGERY | Age: 34
End: 2019-05-19
Payer: OTHER GOVERNMENT

## 2019-05-20 ENCOUNTER — ANESTHESIA (OUTPATIENT)
Dept: SURGERY | Age: 34
End: 2019-05-20
Payer: OTHER GOVERNMENT

## 2019-05-20 ENCOUNTER — HOSPITAL ENCOUNTER (OUTPATIENT)
Age: 34
Discharge: HOME | End: 2019-05-20
Attending: OBSTETRICS & GYNECOLOGY | Admitting: OBSTETRICS & GYNECOLOGY
Payer: OTHER GOVERNMENT

## 2019-05-20 VITALS
WEIGHT: 156 LBS | BODY MASS INDEX: 24.48 KG/M2 | HEIGHT: 67 IN | HEART RATE: 67 BPM | DIASTOLIC BLOOD PRESSURE: 80 MMHG | RESPIRATION RATE: 15 BRPM | OXYGEN SATURATION: 97 % | SYSTOLIC BLOOD PRESSURE: 128 MMHG | TEMPERATURE: 98.3 F

## 2019-05-20 DIAGNOSIS — R10.2 PELVIC PAIN: Primary | ICD-10-CM

## 2019-05-20 LAB
ERYTHROCYTE [DISTWIDTH] IN BLOOD BY AUTOMATED COUNT: 15.1 % (ref 11.9–14.6)
HCG UR QL: NEGATIVE
HCT VFR BLD AUTO: 38.5 % (ref 35.8–46.3)
HGB BLD-MCNC: 12.1 G/DL (ref 11.7–15.4)
MCH RBC QN AUTO: 28.1 PG (ref 26.1–32.9)
MCHC RBC AUTO-ENTMCNC: 31.4 G/DL (ref 31.4–35)
MCV RBC AUTO: 89.3 FL (ref 79.6–97.8)
NRBC # BLD: 0 K/UL (ref 0–0.2)
PLATELET # BLD AUTO: 293 K/UL (ref 150–450)
PMV BLD AUTO: 10.3 FL (ref 9.4–12.3)
RBC # BLD AUTO: 4.31 M/UL (ref 4.05–5.2)
WBC # BLD AUTO: 5.5 K/UL (ref 4.3–11.1)

## 2019-05-20 PROCEDURE — 74011000250 HC RX REV CODE- 250

## 2019-05-20 PROCEDURE — 74011250636 HC RX REV CODE- 250/636

## 2019-05-20 PROCEDURE — 77030033136 HC TBNG INFLO AQUILEX ST HOLO -C: Performed by: OBSTETRICS & GYNECOLOGY

## 2019-05-20 PROCEDURE — 76010000161 HC OR TIME 1 TO 1.5 HR INTENSV-TIER 1: Performed by: OBSTETRICS & GYNECOLOGY

## 2019-05-20 PROCEDURE — 77030003666 HC NDL SPINAL BD -A: Performed by: OBSTETRICS & GYNECOLOGY

## 2019-05-20 PROCEDURE — 77030018836 HC SOL IRR NACL ICUM -A: Performed by: OBSTETRICS & GYNECOLOGY

## 2019-05-20 PROCEDURE — 77030019927 HC TBNG IRR CYSTO BAXT -A: Performed by: OBSTETRICS & GYNECOLOGY

## 2019-05-20 PROCEDURE — 88305 TISSUE EXAM BY PATHOLOGIST: CPT

## 2019-05-20 PROCEDURE — 85027 COMPLETE CBC AUTOMATED: CPT

## 2019-05-20 PROCEDURE — 77030010509 HC AIRWY LMA MSK TELE -A: Performed by: ANESTHESIOLOGY

## 2019-05-20 PROCEDURE — 74011000250 HC RX REV CODE- 250: Performed by: OBSTETRICS & GYNECOLOGY

## 2019-05-20 PROCEDURE — 76060000033 HC ANESTHESIA 1 TO 1.5 HR: Performed by: OBSTETRICS & GYNECOLOGY

## 2019-05-20 PROCEDURE — 77030033137 HC TBNG OUTFLO AQUILEX ST HOLO -B: Performed by: OBSTETRICS & GYNECOLOGY

## 2019-05-20 PROCEDURE — 77030011272 HC ELECTRD CUT LP OCOA -B: Performed by: OBSTETRICS & GYNECOLOGY

## 2019-05-20 PROCEDURE — 76210000006 HC OR PH I REC 0.5 TO 1 HR: Performed by: OBSTETRICS & GYNECOLOGY

## 2019-05-20 PROCEDURE — 74011250636 HC RX REV CODE- 250/636: Performed by: ANESTHESIOLOGY

## 2019-05-20 PROCEDURE — 77030018830 HC SOL IRR GLYC ICUM-A: Performed by: OBSTETRICS & GYNECOLOGY

## 2019-05-20 PROCEDURE — 77030032490 HC SLV COMPR SCD KNE COVD -B: Performed by: OBSTETRICS & GYNECOLOGY

## 2019-05-20 PROCEDURE — 81025 URINE PREGNANCY TEST: CPT

## 2019-05-20 PROCEDURE — 76210000020 HC REC RM PH II FIRST 0.5 HR: Performed by: OBSTETRICS & GYNECOLOGY

## 2019-05-20 RX ORDER — LIDOCAINE HYDROCHLORIDE 10 MG/ML
0.1 INJECTION INFILTRATION; PERINEURAL AS NEEDED
Status: DISCONTINUED | OUTPATIENT
Start: 2019-05-20 | End: 2019-05-20 | Stop reason: HOSPADM

## 2019-05-20 RX ORDER — SODIUM CHLORIDE, SODIUM LACTATE, POTASSIUM CHLORIDE, CALCIUM CHLORIDE 600; 310; 30; 20 MG/100ML; MG/100ML; MG/100ML; MG/100ML
1000 INJECTION, SOLUTION INTRAVENOUS CONTINUOUS
Status: DISCONTINUED | OUTPATIENT
Start: 2019-05-20 | End: 2019-05-20 | Stop reason: HOSPADM

## 2019-05-20 RX ORDER — MIDAZOLAM HYDROCHLORIDE 1 MG/ML
2 INJECTION, SOLUTION INTRAMUSCULAR; INTRAVENOUS
Status: COMPLETED | OUTPATIENT
Start: 2019-05-20 | End: 2019-05-20

## 2019-05-20 RX ORDER — LIDOCAINE HYDROCHLORIDE 20 MG/ML
INJECTION, SOLUTION EPIDURAL; INFILTRATION; INTRACAUDAL; PERINEURAL AS NEEDED
Status: DISCONTINUED | OUTPATIENT
Start: 2019-05-20 | End: 2019-05-20 | Stop reason: HOSPADM

## 2019-05-20 RX ORDER — SODIUM CHLORIDE, SODIUM LACTATE, POTASSIUM CHLORIDE, CALCIUM CHLORIDE 600; 310; 30; 20 MG/100ML; MG/100ML; MG/100ML; MG/100ML
75 INJECTION, SOLUTION INTRAVENOUS CONTINUOUS
Status: DISCONTINUED | OUTPATIENT
Start: 2019-05-20 | End: 2019-05-20 | Stop reason: HOSPADM

## 2019-05-20 RX ORDER — DIPHENHYDRAMINE HYDROCHLORIDE 50 MG/ML
12.5 INJECTION, SOLUTION INTRAMUSCULAR; INTRAVENOUS ONCE
Status: DISCONTINUED | OUTPATIENT
Start: 2019-05-20 | End: 2019-05-20 | Stop reason: HOSPADM

## 2019-05-20 RX ORDER — HYDROMORPHONE HYDROCHLORIDE 2 MG/ML
0.5 INJECTION, SOLUTION INTRAMUSCULAR; INTRAVENOUS; SUBCUTANEOUS
Status: DISCONTINUED | OUTPATIENT
Start: 2019-05-20 | End: 2019-05-20 | Stop reason: HOSPADM

## 2019-05-20 RX ORDER — NALOXONE HYDROCHLORIDE 0.4 MG/ML
0.1 INJECTION, SOLUTION INTRAMUSCULAR; INTRAVENOUS; SUBCUTANEOUS AS NEEDED
Status: DISCONTINUED | OUTPATIENT
Start: 2019-05-20 | End: 2019-05-20 | Stop reason: HOSPADM

## 2019-05-20 RX ORDER — SODIUM CHLORIDE 0.9 % (FLUSH) 0.9 %
5-40 SYRINGE (ML) INJECTION AS NEEDED
Status: DISCONTINUED | OUTPATIENT
Start: 2019-05-20 | End: 2019-05-20 | Stop reason: HOSPADM

## 2019-05-20 RX ORDER — FENTANYL CITRATE 50 UG/ML
INJECTION, SOLUTION INTRAMUSCULAR; INTRAVENOUS AS NEEDED
Status: DISCONTINUED | OUTPATIENT
Start: 2019-05-20 | End: 2019-05-20 | Stop reason: HOSPADM

## 2019-05-20 RX ORDER — ONDANSETRON 2 MG/ML
4 INJECTION INTRAMUSCULAR; INTRAVENOUS ONCE
Status: DISCONTINUED | OUTPATIENT
Start: 2019-05-20 | End: 2019-05-20 | Stop reason: HOSPADM

## 2019-05-20 RX ORDER — SODIUM CHLORIDE 0.9 % (FLUSH) 0.9 %
5-40 SYRINGE (ML) INJECTION EVERY 8 HOURS
Status: DISCONTINUED | OUTPATIENT
Start: 2019-05-20 | End: 2019-05-20 | Stop reason: HOSPADM

## 2019-05-20 RX ORDER — DEXAMETHASONE SODIUM PHOSPHATE 4 MG/ML
INJECTION, SOLUTION INTRA-ARTICULAR; INTRALESIONAL; INTRAMUSCULAR; INTRAVENOUS; SOFT TISSUE AS NEEDED
Status: DISCONTINUED | OUTPATIENT
Start: 2019-05-20 | End: 2019-05-20 | Stop reason: HOSPADM

## 2019-05-20 RX ORDER — ACETAMINOPHEN 500 MG
500 TABLET ORAL ONCE
Status: DISCONTINUED | OUTPATIENT
Start: 2019-05-20 | End: 2019-05-20 | Stop reason: HOSPADM

## 2019-05-20 RX ORDER — OXYCODONE HYDROCHLORIDE 5 MG/1
10 TABLET ORAL
Status: DISCONTINUED | OUTPATIENT
Start: 2019-05-20 | End: 2019-05-20 | Stop reason: HOSPADM

## 2019-05-20 RX ORDER — EPHEDRINE SULFATE 50 MG/ML
INJECTION, SOLUTION INTRAVENOUS AS NEEDED
Status: DISCONTINUED | OUTPATIENT
Start: 2019-05-20 | End: 2019-05-20 | Stop reason: HOSPADM

## 2019-05-20 RX ORDER — FENTANYL CITRATE 50 UG/ML
100 INJECTION, SOLUTION INTRAMUSCULAR; INTRAVENOUS AS NEEDED
Status: DISCONTINUED | OUTPATIENT
Start: 2019-05-20 | End: 2019-05-20 | Stop reason: HOSPADM

## 2019-05-20 RX ORDER — PROPOFOL 10 MG/ML
INJECTION, EMULSION INTRAVENOUS AS NEEDED
Status: DISCONTINUED | OUTPATIENT
Start: 2019-05-20 | End: 2019-05-20 | Stop reason: HOSPADM

## 2019-05-20 RX ORDER — HYDROCODONE BITARTRATE AND ACETAMINOPHEN 5; 325 MG/1; MG/1
1 TABLET ORAL
Qty: 15 TAB | Refills: 0 | Status: SHIPPED | OUTPATIENT
Start: 2019-05-20 | End: 2019-05-23

## 2019-05-20 RX ORDER — IBUPROFEN 800 MG/1
800 TABLET ORAL
Qty: 40 TAB | Refills: 0 | Status: SHIPPED | OUTPATIENT
Start: 2019-05-20 | End: 2019-05-30

## 2019-05-20 RX ORDER — KETOROLAC TROMETHAMINE 30 MG/ML
INJECTION, SOLUTION INTRAMUSCULAR; INTRAVENOUS AS NEEDED
Status: DISCONTINUED | OUTPATIENT
Start: 2019-05-20 | End: 2019-05-20 | Stop reason: HOSPADM

## 2019-05-20 RX ORDER — ONDANSETRON 2 MG/ML
INJECTION INTRAMUSCULAR; INTRAVENOUS AS NEEDED
Status: DISCONTINUED | OUTPATIENT
Start: 2019-05-20 | End: 2019-05-20 | Stop reason: HOSPADM

## 2019-05-20 RX ORDER — OXYCODONE HYDROCHLORIDE 5 MG/1
5 TABLET ORAL
Status: DISCONTINUED | OUTPATIENT
Start: 2019-05-20 | End: 2019-05-20 | Stop reason: HOSPADM

## 2019-05-20 RX ORDER — LIDOCAINE HYDROCHLORIDE AND EPINEPHRINE 10; 10 MG/ML; UG/ML
INJECTION, SOLUTION INFILTRATION; PERINEURAL AS NEEDED
Status: DISCONTINUED | OUTPATIENT
Start: 2019-05-20 | End: 2019-05-20 | Stop reason: HOSPADM

## 2019-05-20 RX ADMIN — MIDAZOLAM 2 MG: 1 INJECTION INTRAMUSCULAR; INTRAVENOUS at 13:31

## 2019-05-20 RX ADMIN — HYDROMORPHONE HYDROCHLORIDE 0.5 MG: 2 INJECTION INTRAMUSCULAR; INTRAVENOUS; SUBCUTANEOUS at 15:08

## 2019-05-20 RX ADMIN — DEXAMETHASONE SODIUM PHOSPHATE 10 MG: 4 INJECTION, SOLUTION INTRA-ARTICULAR; INTRALESIONAL; INTRAMUSCULAR; INTRAVENOUS; SOFT TISSUE at 13:50

## 2019-05-20 RX ADMIN — HYDROMORPHONE HYDROCHLORIDE 0.5 MG: 2 INJECTION INTRAMUSCULAR; INTRAVENOUS; SUBCUTANEOUS at 15:01

## 2019-05-20 RX ADMIN — FENTANYL CITRATE 25 MCG: 50 INJECTION, SOLUTION INTRAMUSCULAR; INTRAVENOUS at 14:08

## 2019-05-20 RX ADMIN — KETOROLAC TROMETHAMINE 30 MG: 30 INJECTION, SOLUTION INTRAMUSCULAR; INTRAVENOUS at 14:33

## 2019-05-20 RX ADMIN — FENTANYL CITRATE 25 MCG: 50 INJECTION, SOLUTION INTRAMUSCULAR; INTRAVENOUS at 13:51

## 2019-05-20 RX ADMIN — LIDOCAINE HYDROCHLORIDE 100 MG: 20 INJECTION, SOLUTION EPIDURAL; INFILTRATION; INTRACAUDAL; PERINEURAL at 13:43

## 2019-05-20 RX ADMIN — PROPOFOL 200 MG: 10 INJECTION, EMULSION INTRAVENOUS at 13:43

## 2019-05-20 RX ADMIN — EPHEDRINE SULFATE 5 MG: 50 INJECTION, SOLUTION INTRAVENOUS at 14:05

## 2019-05-20 RX ADMIN — SODIUM CHLORIDE, SODIUM LACTATE, POTASSIUM CHLORIDE, AND CALCIUM CHLORIDE: 600; 310; 30; 20 INJECTION, SOLUTION INTRAVENOUS at 13:36

## 2019-05-20 RX ADMIN — ONDANSETRON 4 MG: 2 INJECTION INTRAMUSCULAR; INTRAVENOUS at 13:50

## 2019-05-20 RX ADMIN — FENTANYL CITRATE 25 MCG: 50 INJECTION, SOLUTION INTRAMUSCULAR; INTRAVENOUS at 14:18

## 2019-05-20 RX ADMIN — FENTANYL CITRATE 25 MCG: 50 INJECTION, SOLUTION INTRAMUSCULAR; INTRAVENOUS at 14:29

## 2019-05-20 RX ADMIN — FENTANYL CITRATE 50 MCG: 50 INJECTION, SOLUTION INTRAMUSCULAR; INTRAVENOUS at 13:43

## 2019-05-20 RX ADMIN — SODIUM CHLORIDE, SODIUM LACTATE, POTASSIUM CHLORIDE, AND CALCIUM CHLORIDE 1000 ML: 600; 310; 30; 20 INJECTION, SOLUTION INTRAVENOUS at 13:32

## 2019-05-20 NOTE — BRIEF OP NOTE
BRIEF OPERATIVE NOTE    Date of Procedure: 5/20/2019   Preoperative Diagnosis: Uterine leiomyoma, unspecified location [D25.9]  Pelvic pain in female [R10.2]  Postoperative Diagnosis: Uterine leiomyoma, unspecified location [D25.9]  Pelvic pain in female [R10.2]    Procedure(s):   HYSTEROSCOPIC MYOMECTOMY of 3 uterine fibroids  Surgeon(s) and Role:     * Constantin Paz MD - Primary         Surgical Assistant: none    Surgical Staff:  Circ-1: Apolinar Ruggiero  Scrub Tech-1: Betina Tinsley  Scrub Tech-2: Petty Marley  Event Time In Time Out   Incision Start 1407    Incision Close 1431      Anesthesia: General   Estimated Blood Loss: <10cc  Specimens:   ID Type Source Tests Collected by Time Destination   1 : UTERINE FIBROIDS X 3 Preservative   Melina Anaya MD 5/20/2019 1426 Pathology      Findings: 2cm type 0 fibroid, 1.5cm type 2 fibroid, and 0.5cm type 1 fibroid in uterine cavity  Complications: none  Implants: * No implants in log *

## 2019-05-20 NOTE — ANESTHESIA POSTPROCEDURE EVALUATION
Procedure(s): HYSTEROSCOPIC MYOMECTOMY. general 
 
Anesthesia Post Evaluation Multimodal analgesia: multimodal analgesia used between 6 hours prior to anesthesia start to PACU discharge Patient location during evaluation: PACU Patient participation: complete - patient participated Level of consciousness: awake Pain management: adequate Airway patency: patent Anesthetic complications: no 
Cardiovascular status: acceptable Respiratory status: spontaneous ventilation and acceptable Hydration status: acceptable Post anesthesia nausea and vomiting:  none Vitals Value Taken Time /80 5/20/2019  3:33 PM  
Temp 36.8 °C (98.3 °F) 5/20/2019  2:43 PM  
Pulse 67 5/20/2019  3:33 PM  
Resp 15 5/20/2019  3:33 PM  
SpO2 97 % 5/20/2019  3:33 PM

## 2019-05-20 NOTE — ANESTHESIA PREPROCEDURE EVALUATION
Relevant Problems No relevant active problems Anesthetic History No history of anesthetic complications Review of Systems / Medical History Patient summary reviewed and pertinent labs reviewed Pulmonary Within defined limits Neuro/Psych Within defined limits Cardiovascular Within defined limits Exercise tolerance: >4 METS Comments: Denies recent CP, SOB or Palpitations GI/Hepatic/Renal 
  
GERD: well controlled Endo/Other Anemia Other Findings Physical Exam 
 
Airway Mallampati: I 
TM Distance: > 6 cm Neck ROM: normal range of motion Mouth opening: Normal 
 
 Cardiovascular Regular rate and rhythm,  S1 and S2 normal,  no murmur, click, rub, or gallop Dental 
No notable dental hx Pulmonary Breath sounds clear to auscultation Abdominal 
GI exam deferred Other Findings Anesthetic Plan ASA: 2 Anesthesia type: general 
 
 
 
 
 
Anesthetic plan and risks discussed with: Patient and Family

## 2019-05-20 NOTE — H&P
PATIENT HISTORY  Patient with a long history uterine fibroids, prior removal of a single fibroid through open myomectomy in 2008. Later SBO. Has recurrent multiple large and small fibroids with associated menorrhagia. Uterus extends to 20wk gestational size    Underwent a robotic [facility] myomectomy of a large number of fibroids earlier in the year, but the intracavitary fibroids could not be addressed due to an instrument issue     Past Medical: Fibroids, GERD     Past Surgical- Open myomectomy- 2008, open small bowel resection due to obstruction in 2012.,   [place] [facility] [place]      Meds: Norethindrone 5mg/d, Zantac PRN     Allergies: NKDA     Soc: works as an [employer]         Review of Systems:   Constitutional: Heather Saleh denies fever, weight loss or other constitutional symptoms. Eye: Heather Saleh denies ocular pain or other visual symptoms. Cardiovascular: Heather Saleh denies palpitations, chest pain or other cardiovascular problem. Respiratory: Heather Saleh denies cough, breathing difficulty or other respiratory condition. Gastrointestinal: Tiarra denies nausea, vomiting, diarrhea, constipation or other gastrointestinal symptom. Genitourinary: Heather Saleh denies dysuria, incontinence, hematuria or other genitourinary symptom. Integumentary: Heather Saleh denies galactorrhea or other breast problem. Neurologic: Heather Saleh denies sensory defects, problems with coordination or concentration, or other neurologic problem. Psychiatric: Heather Saleh denies anxiey, depression or any psychiatric problems.      VITAL SIGNS   Height : 5 ft 7 in. Weight: 154 BMI: 24.158   BP: 113 / 78 Resp. : 16 . . PHYSICAL EXAM   General Appearance: Well developed, well nourished patient in no acute distress. Skin: Normal. No rash, lesions or hirsutism. Head: Normocephalic / atraumatic. EENT: Normal eyes, ears, nose and throat. Neck: No masses, lymphadenopathy or thyromegaly.    Chest: Normal breath sounds bilaterally with no rales, rhonchi or wheezes. Cardiac: Regular rate and rhythm without murmurs, rubs or gallops. Abdomen: Soft and non-tender to palpation. No organomegaly or massess. Extremities: No cyanosis, clubbing or edema. Neurologic: Grossly intact. Female Pelvic: Normal.         DISCUSSION / INFORMED CONSENT  The patient and mother was present for this discussion. We discussed the details of the planned surgery including the risks, benefits and treatment alternatives. We discussed the incision sites and the anticipated recovery period as well as the risks, benefits, and alternatives to the procedure. The risks include damage to bowel, bladder, or other organs, risk of bleeding, infection, adhesion formation, risk of the procedure not being able to be fully completed and need for future surgery. There are also small risks of requiring a blood transfusion, risks associated with anesthesia, development of deep venous thrombosis, heart attack, and even death. We discussed the possibility that this surgery may not completely eliminate her problem and that future procedures may be needed. Specific to a hysteroscopy are risks of uterine perforation, uterine bleeding, infection, and the risks of fluid overload due to absorption of hysteroscopic fluids. In some cases laparoscopy will need to be performed, and in rare cases, mainly due bleeding from perforation, a laparotomy, or incision on the abdomen will be necessary. Discussed removal of the fibroids by [facility] and the fact that they may be incompletely removed, requiring further cleaning up in the future.    There can also be development of intrauterine scar tissue     PLAN:   [facility] myomectomy

## 2019-05-27 NOTE — OP NOTES
[place]  OPERATIVE REPORT    Name:  Krish Patel  MR#:  [id number]  :  1985  ACCOUNT #:  [de-identified]  DATE OF SERVICE:  2019    PREOPERATIVE DIAGNOSES:  1.  Submucous uterine fibroids. 2.  Menorrhagia. POSTOPERATIVE DIAGNOSES:  1.  Submucous uterine fibroids. 2.  Menorrhagia. PROCEDURE PERFORMED:  Hysteroscopic myomectomy of 3 uterine fibroids. SURGEON:  Anna Trevizo MD    ASSISTANT:  None. ANESTHESIA:  General endotracheal.    COMPLICATIONS:  None. SPECIMENS REMOVED:  Uterine fibroids x3    IMPLANTS:  None. ESTIMATED BLOOD LOSS:  Less than 10 mL. DRAINS:  None. FINDINGS:  At hysteroscopy, there was a 2 cm type 0 fibroid along with a 1.5 cm type 2 fibroid and a 0.5 cm type 1 fibroid in the uterine cavity. There were no other injury, uterine adhesions, or other visualized fibroids. All fibroids were completely excised. PROCEDURE:  The patient was taken to the operating room, where general endotracheal anesthesia was administered. She was prepped and draped in normal sterile fashion. A Escamilla catheter was inserted. Diagnostic hysteroscopy was first performed revealing a 2 cm intracavitary type 0 fibroid, which was originating from the left posterior uterine wall. There was also a 1.5 cm type 2 fibroid that was mostly within the uterine wall, but was extending approximately one-third of its size into the cavity and then there was a small 0.5 cm type 1 fibroid in the left upper sidewall. The cervix was then dilated up further. A paracervical block was performed with lidocaine with epinephrine. The resectoscope was then used and placed with an electrical loop. Glycine was used as the insulation medium. Using the loop monopolar on cut current, the 2-cm intracavitary fibroid was cut up and completely removed. The camera was removed intermittently to remove the fibroid chips.   The small 0.5 cm type 1 fibroid was easily enucleated with minimal electrocautery use. Attention was then turned to the posterior 1.5 cm type 2 fibroid that did have good margins delineated, and this was cut out with multiple cut passes. The fibroid had a necrotic appearance within it that made its delineation from the normal myometrium easy and successive cuts were made until it was felt that the entire fibroid was removed. There was minimal bleeding during this procedure. The total glycine loss was approximately 400 mL. The uterine cavity was inspected and was fully hemostatic. The camera was then removed and the uterus watched for bleeding. It still appeared to be hemostatic. Anesthesia was reversed, and the patient was taken to the recovery room in stable condition. All sponge, lap, and needle counts were correct and there were no other complications to the procedure.         Prudence MD Papo      [id number]  D:  05/27/2019 8:01  T:  05/27/2019 8:05  JOB #:  [id number]

## (undated) DEVICE — REM POLYHESIVE ADULT PATIENT RETURN ELECTRODE: Brand: VALLEYLAB

## (undated) DEVICE — DRAPE,UNDERBUTTOCKS,PCH,STERILE: Brand: MEDLINE

## (undated) DEVICE — BASIC SINGLE BASIN-LF: Brand: MEDLINE INDUSTRIES, INC.

## (undated) DEVICE — SUTURE V-LOC 180 SZ 2-0 L12IN ABSRB VLT GS-21 L37MM 1/2 CIR VLOCM0315

## (undated) DEVICE — NEEDLE HYPO 25GA L5/8IN ORNG HUB S STL LATCH BVL UP

## (undated) DEVICE — SOLUTION IRRIG 3000ML 0.9% SOD CHL FLX CONT 0797208] ICU MEDICAL INC]

## (undated) DEVICE — CONTAINER SPEC HISTOLOGY 900ML POLYPR

## (undated) DEVICE — VISUALIZATION SYSTEM: Brand: CLEARIFY

## (undated) DEVICE — CONTROL SYRINGE LUER-LOCK TIP: Brand: MONOJECT

## (undated) DEVICE — MORCELLATOR ENDO 15MM OBT DISPOSABLEXCISE

## (undated) DEVICE — DRAPE INSTR ARM ROBOTIC ENDOWRIST DA VINCI S

## (undated) DEVICE — BARRIER TISS ADH ABSRB 3X4IN -- GYNECARE INTERCEED

## (undated) DEVICE — TRAY PREP DRY W/ PREM GLV 2 APPL 6 SPNG 2 UNDPD 1 OVERWRAP

## (undated) DEVICE — (D)PREP SKN CHLRAPRP APPL 26ML -- CONVERT TO ITEM 371833

## (undated) DEVICE — PERI-PAD,MODERATE: Brand: CURITY

## (undated) DEVICE — CYSTO/BLADDER IRRIGATION SET, REGULATING CLAMP

## (undated) DEVICE — APPLICATOR BNDG 1MM ADH PREMIERPRO EXOFIN

## (undated) DEVICE — BAG DRNGE 4000ML CONT IRRIG ROUNDED TEARDROP SHP DISP

## (undated) DEVICE — KENDALL SCD EXPRESS SLEEVES, KNEE LENGTH, MEDIUM: Brand: KENDALL SCD

## (undated) DEVICE — SET EXTN 27ML TBNG L20IN DIA0100IN MACBOR FEM ADPT SLDE

## (undated) DEVICE — MONOPOLAR CURVED SCISSORS: Brand: ENDOWRIST

## (undated) DEVICE — TUBE ST FLD CTRL AQUILEX INFLO --

## (undated) DEVICE — CATHETER URETH 16FR BLLN 5CC SIL ALLY W/ SIL HYDRGEL 2 W F

## (undated) DEVICE — DRAPE ROBOTIC CAM ARM DISP ENDOWRIST DA VINCI SI

## (undated) DEVICE — NEEDLE SPNL 22GA L3.5IN BLK HUB S STL REG WALL FIT STYL W/

## (undated) DEVICE — SYR LR LCK 1ML GRAD NSAF 30ML --

## (undated) DEVICE — LEGGINGS, PAIR, 31X48, STERILE: Brand: MEDLINE

## (undated) DEVICE — OBTRTR BLDELSS 8MM DISP -- DA VINCI - SNGL USE

## (undated) DEVICE — 2000CC GUARDIAN II: Brand: GUARDIAN

## (undated) DEVICE — CONTAINER SPEC FRMLN 120ML --

## (undated) DEVICE — Z DUPLICATE USE 2847003 INJECTOR UTER

## (undated) DEVICE — TIP COVER ACCESSORY

## (undated) DEVICE — DRAPE ROBOTIC CAM HD DISP ENDOWRIST DA VINCI SI

## (undated) DEVICE — Device

## (undated) DEVICE — CORD DISPOSABLE ACTIVE 10FT -- GYRUS

## (undated) DEVICE — GOWN,REINF,POLY,ECL,PP SLV,XL: Brand: MEDLINE

## (undated) DEVICE — WARMER SCP STRL DISP

## (undated) DEVICE — SUTURE MCRYL SZ 4-0 L27IN ABSRB UD L19MM PS-2 1/2 CIR PRIM Y426H

## (undated) DEVICE — CANNULA SEAL

## (undated) DEVICE — DRAPE,TOP,102X53,STERILE: Brand: MEDLINE

## (undated) DEVICE — 2, DISPOSABLE SUCTION/IRRIGATOR WITHOUT DISPOSABLE TIP: Brand: STRYKEFLOW

## (undated) DEVICE — ELECTRO LUBE IS A SINGLE PATIENT USE DEVICE THAT IS INTENDED TO BE USED ON ELECTROSURGICAL ELECTRODES TO REDUCE STICKING.: Brand: KEY SURGICAL ELECTRO LUBE

## (undated) DEVICE — FCPS GRSP TENACULUM 8MM -- DA VINCI - REUSABLE 10X

## (undated) DEVICE — FILTER SMK EVAC FLO CLR MEGADYNE

## (undated) DEVICE — TELFA NON-ADHERENT ABSORBENT DRESSING: Brand: TELFA

## (undated) DEVICE — SYR 10ML LUER LOK 1/5ML GRAD --

## (undated) DEVICE — LOGICUT SCISSOR LENGTH 320MM: Brand: LOGI - LAPAROSCOPIC INSTRUMENT SYSTEM

## (undated) DEVICE — LAP CHOLE: Brand: MEDLINE INDUSTRIES, INC.

## (undated) DEVICE — FENESTRATED BIPOLAR FORCEPS: Brand: ENDOWRIST;DAVINCI SI

## (undated) DEVICE — SYRINGE TB 1ML NDL 27GA L0.5IN PLAS W/ SFTY LOK PERM NDL

## (undated) DEVICE — SOLUTION IRRIG 1000ML H2O STRL BLT

## (undated) DEVICE — TUBE ST FLD AQUILEX OUTFLO --

## (undated) DEVICE — ELECTRODE LOOP 24FR R ANG MPLR CUT

## (undated) DEVICE — COVER,TABLE,44X76,STERILE: Brand: MEDLINE

## (undated) DEVICE — SPONGE LAP 18X18IN STRL -- 5/PK

## (undated) DEVICE — 3M™ TEGADERM™ TRANSPARENT FILM DRESSING FRAME STYLE, 1627, 4 IN X 10 IN (10 CM X 25 CM), 20/CT 4CT/CASE: Brand: 3M™ TEGADERM™

## (undated) DEVICE — APPLICATOR FBR TIP L6IN COT TIP WOOD SHFT SWAB 2000 PER CA

## (undated) DEVICE — 40585 XL ADVANCED TRENDELENBURG POSITIONING KIT: Brand: 40585 XL ADVANCED TRENDELENBURG POSITIONING KIT

## (undated) DEVICE — AIRSEAL 8 MM ACCESS PORT AND LOW PROFILE OBTURATOR WITH BLADELESS OPTICAL TIP, 120 MM LENGTH: Brand: AIRSEAL

## (undated) DEVICE — MEGASUTURECUT ND: Brand: ENDOWRIST;DAVINCI SI

## (undated) DEVICE — BLADELESS OPTICAL TROCAR WITH FIXATION CANNULA: Brand: VERSAONE

## (undated) DEVICE — SOL IRR GLYC 1.5 % 3000ML --

## (undated) DEVICE — TRI-LUMEN FILTERED TUBE SET WITH ACTIVATED CHARCOAL FILTER: Brand: AIRSEAL

## (undated) DEVICE — SUTURE SZ 0 27IN 5/8 CIR UR-6  TAPER PT VIOLET ABSRB VICRYL J603H

## (undated) DEVICE — INSUFFLATION NEEDLE: Brand: SURGINEEDLE

## (undated) DEVICE — JELLY LUBRICATING 10GM PREFIL SYR LUBE

## (undated) DEVICE — BLADELESS OPTICAL TROCAR WITH FIXATION CANNULA: Brand: VERSAPORT

## (undated) DEVICE — COVER LT HNDL FOR OR SURG LAMP

## (undated) DEVICE — DRAPE TWL SURG 16X26IN BLU ORB04] ALLCARE INC]

## (undated) DEVICE — CARDINAL HEALTH FLEXIBLE LIGHT HANDLE COVER: Brand: CARDINAL HEALTH